# Patient Record
Sex: MALE | Race: WHITE | HISPANIC OR LATINO
[De-identification: names, ages, dates, MRNs, and addresses within clinical notes are randomized per-mention and may not be internally consistent; named-entity substitution may affect disease eponyms.]

---

## 2020-12-07 PROBLEM — Z00.00 ENCOUNTER FOR PREVENTIVE HEALTH EXAMINATION: Status: ACTIVE | Noted: 2020-12-07

## 2020-12-08 ENCOUNTER — APPOINTMENT (OUTPATIENT)
Dept: UROLOGY | Facility: CLINIC | Age: 40
End: 2020-12-08
Payer: COMMERCIAL

## 2020-12-08 VITALS
BODY MASS INDEX: 41.77 KG/M2 | TEMPERATURE: 97.7 F | HEART RATE: 85 BPM | HEIGHT: 69 IN | WEIGHT: 282 LBS | SYSTOLIC BLOOD PRESSURE: 131 MMHG | DIASTOLIC BLOOD PRESSURE: 77 MMHG

## 2020-12-08 DIAGNOSIS — F17.200 NICOTINE DEPENDENCE, UNSPECIFIED, UNCOMPLICATED: ICD-10-CM

## 2020-12-08 DIAGNOSIS — Z83.3 FAMILY HISTORY OF DIABETES MELLITUS: ICD-10-CM

## 2020-12-08 DIAGNOSIS — Z86.39 PERSONAL HISTORY OF OTHER ENDOCRINE, NUTRITIONAL AND METABOLIC DISEASE: ICD-10-CM

## 2020-12-08 PROCEDURE — 99072 ADDL SUPL MATRL&STAF TM PHE: CPT

## 2020-12-08 PROCEDURE — 99204 OFFICE O/P NEW MOD 45 MIN: CPT

## 2020-12-08 RX ORDER — SILDENAFIL 20 MG/1
20 TABLET ORAL DAILY
Qty: 30 | Refills: 0 | Status: ACTIVE | COMMUNITY
Start: 2020-12-08 | End: 1900-01-01

## 2020-12-08 RX ORDER — METFORMIN ER 750 MG 750 MG/1
750 TABLET ORAL
Qty: 180 | Refills: 0 | Status: ACTIVE | COMMUNITY
Start: 2020-07-13

## 2020-12-08 RX ORDER — DULAGLUTIDE 1.5 MG/.5ML
1.5 INJECTION, SOLUTION SUBCUTANEOUS
Qty: 6 | Refills: 0 | Status: ACTIVE | COMMUNITY
Start: 2020-03-03

## 2020-12-08 NOTE — REASON FOR VISIT
[[employer] ] : provided by [employer]   [FreeTextEntry1] : [id number] [FreeTextEntry2] : gage [TWNoteComboBox1] : [place]

## 2020-12-08 NOTE — PHYSICAL EXAM
[General Appearance - Well Developed] : well developed [General Appearance - Well Nourished] : well nourished [Normal Appearance] : normal appearance [Well Groomed] : well groomed [General Appearance - In No Acute Distress] : no acute distress [Edema] : no peripheral edema [Respiration, Rhythm And Depth] : normal respiratory rhythm and effort [Exaggerated Use Of Accessory Muscles For Inspiration] : no accessory muscle use [Abdomen Soft] : soft [Abdomen Tenderness] : non-tender [Costovertebral Angle Tenderness] : no ~M costovertebral angle tenderness [Urethral Meatus] : meatus normal [Penis Abnormality] : normal uncircumcised penis [Urinary Bladder Findings] : the bladder was normal on palpation [Scrotum] : the scrotum was normal [Epididymis] : the epididymides were normal [Testes Tenderness] : no tenderness of the testes [Testes Mass (___cm)] : there were no testicular masses [Normal Station and Gait] : the gait and station were normal for the patient's age [] : no rash [No Focal Deficits] : no focal deficits [Oriented To Time, Place, And Person] : oriented to person, place, and time [Affect] : the affect was normal [Mood] : the mood was normal [Not Anxious] : not anxious [FreeTextEntry1] : obese

## 2020-12-08 NOTE — ASSESSMENT
[FreeTextEntry1] : Plan\par PSA\par testosterone\par LH\par control diabetes\par stop smoking\par testicular US\par start viagra\par scrotal support\par OTC motrin for pain\par fu 1 month\par

## 2020-12-08 NOTE — HISTORY OF PRESENT ILLNESS
[FreeTextEntry1] : Mr. RUDDY SOTO 40 year old  M  PMH DM and PSH left hip. Pt come sin bc of of left testicular pain with inflammation and feels the pain goes to the front to the back. This has been going on for 6 months.  Feels the pain is not worse with lifting.  Pt having issues with ejaculation and maintaining an erection. He states he doesn't orgasm bc his erections are poor. Pt having issues with maintaining an erection. Pt has tried medication foe erection but does not know name. Feels his sexual desire is good. Pt denies any issues with urination. Pt denies having imaging of the scrotum. Denies [facility] of cancer. Pt currently smokes for about 1 year.  Pt has 4 kids.

## 2020-12-08 NOTE — REVIEW OF SYSTEMS
[Abdominal Pain] : abdominal pain [both] : pain during and after intercourse [Poor quality erections] : Poor quality erections [No erections] : no erections [Bladder pressure] : experiences bladder pressure [Interrupted urine stream] : interrupted urine stream [Bladder fullness after urinating] : bladder fullness after urinating [Muscle Weakness] : muscle weakness [Negative] : Heme/Lymph [see HPI] : see HPI

## 2020-12-12 ENCOUNTER — RESULT REVIEW (OUTPATIENT)
Age: 40
End: 2020-12-12

## 2020-12-12 ENCOUNTER — APPOINTMENT (OUTPATIENT)
Dept: ULTRASOUND IMAGING | Facility: CLINIC | Age: 40
End: 2020-12-12
Payer: COMMERCIAL

## 2020-12-12 ENCOUNTER — OUTPATIENT (OUTPATIENT)
Dept: OUTPATIENT SERVICES | Facility: HOSPITAL | Age: 40
LOS: 1 days | End: 2020-12-12
Payer: COMMERCIAL

## 2020-12-12 DIAGNOSIS — N50.819 TESTICULAR PAIN, UNSPECIFIED: ICD-10-CM

## 2020-12-12 PROCEDURE — 76870 US EXAM SCROTUM: CPT | Mod: 26

## 2020-12-12 PROCEDURE — 76870 US EXAM SCROTUM: CPT

## 2020-12-28 ENCOUNTER — TRANSCRIPTION ENCOUNTER (OUTPATIENT)
Age: 40
End: 2020-12-28

## 2021-01-08 ENCOUNTER — APPOINTMENT (OUTPATIENT)
Dept: UROLOGY | Facility: CLINIC | Age: 41
End: 2021-01-08
Payer: COMMERCIAL

## 2021-01-08 VITALS
DIASTOLIC BLOOD PRESSURE: 77 MMHG | HEART RATE: 83 BPM | SYSTOLIC BLOOD PRESSURE: 117 MMHG | WEIGHT: 282 LBS | TEMPERATURE: 97.1 F | HEIGHT: 69 IN | BODY MASS INDEX: 41.77 KG/M2

## 2021-01-08 DIAGNOSIS — N52.9 MALE ERECTILE DYSFUNCTION, UNSPECIFIED: ICD-10-CM

## 2021-01-08 DIAGNOSIS — N50.819 TESTICULAR PAIN, UNSPECIFIED: ICD-10-CM

## 2021-01-08 LAB
LH SERPL-ACNC: 6.1 IU/L
PSA SERPL-MCNC: 0.19 NG/ML
TESTOST BND SERPL-MCNC: 13.3 PG/ML
TESTOST SERPL-MCNC: 309.1 NG/DL

## 2021-01-08 PROCEDURE — 99213 OFFICE O/P EST LOW 20 MIN: CPT

## 2021-01-08 PROCEDURE — 99072 ADDL SUPL MATRL&STAF TM PHE: CPT

## 2021-01-08 NOTE — HISTORY OF PRESENT ILLNESS
[FreeTextEntry1] : Pt comes in follow up ED and testicular pain. Pt still having pain when he sits down. States he is starting to quit smoking. DIabetes is good. Pt started viagra and took 3 with help but got some pain at the tip of the penis. 2 helped a little.

## 2021-01-08 NOTE — REASON FOR VISIT
[Follow-up Visit ___] : a follow-up visit  for [unfilled] [[employer] ] : provided by [employer]   [FreeTextEntry1] : [id number] [FreeTextEntry2] : [place] [TWNoteComboBox1] : [place]

## 2021-01-14 ENCOUNTER — APPOINTMENT (OUTPATIENT)
Dept: UROLOGY | Facility: CLINIC | Age: 41
End: 2021-01-14